# Patient Record
Sex: MALE | HISPANIC OR LATINO | URBAN - METROPOLITAN AREA
[De-identification: names, ages, dates, MRNs, and addresses within clinical notes are randomized per-mention and may not be internally consistent; named-entity substitution may affect disease eponyms.]

---

## 2021-03-29 ENCOUNTER — OFFICE VISIT CONVERTED (OUTPATIENT)
Dept: ONCOLOGY | Facility: HOSPITAL | Age: 31
End: 2021-03-29
Attending: INTERNAL MEDICINE

## 2021-03-29 ENCOUNTER — HOSPITAL ENCOUNTER (OUTPATIENT)
Dept: OTHER | Facility: HOSPITAL | Age: 31
Discharge: HOME | End: 2021-03-29
Attending: INTERNAL MEDICINE

## 2021-03-29 LAB
BASOPHILS # BLD AUTO: 0.01 10*3/UL (ref 0–0.2)
BASOPHILS NFR BLD AUTO: 0.3 % (ref 0–3)
CONV ABS IMM GRAN: 0.01 10*3/UL (ref 0–0.2)
CONV IMMATURE GRAN: 0.3 % (ref 0–1.8)
DEPRECATED RDW RBC AUTO: 42.9 FL (ref 35.1–43.9)
EOSINOPHIL # BLD AUTO: 0.01 10*3/UL (ref 0–0.7)
EOSINOPHIL # BLD AUTO: 0.3 % (ref 0–7)
ERYTHROCYTE [DISTWIDTH] IN BLOOD BY AUTOMATED COUNT: 12.6 % (ref 11.6–14.4)
FERRITIN SERPL-MCNC: 215 NG/ML (ref 30–300)
FOLATE SERPL-MCNC: 10.4 NG/ML (ref 4.8–20)
HCT VFR BLD AUTO: 48.2 % (ref 42–52)
HGB BLD-MCNC: 16.8 G/DL (ref 14–18)
IRON SATN MFR SERPL: 38 % (ref 20–55)
IRON SERPL-MCNC: 107 UG/DL (ref 70–180)
LYMPHOCYTES # BLD AUTO: 1.1 10*3/UL (ref 1–5)
LYMPHOCYTES NFR BLD AUTO: 29.6 % (ref 20–45)
MCH RBC QN AUTO: 32.2 PG (ref 27–31)
MCHC RBC AUTO-ENTMCNC: 34.9 G/DL (ref 33–37)
MCV RBC AUTO: 92.3 FL (ref 80–96)
MONOCYTES # BLD AUTO: 0.23 10*3/UL (ref 0.2–1.2)
MONOCYTES NFR BLD AUTO: 6.2 % (ref 3–10)
NEUTROPHILS # BLD AUTO: 2.36 10*3/UL (ref 2–8)
NEUTROPHILS NFR BLD AUTO: 63.3 % (ref 30–85)
NRBC CBCN: 0 % (ref 0–0.7)
PLATELET # BLD AUTO: 181 10*3/UL (ref 130–400)
PMV BLD AUTO: 10.3 FL (ref 9.4–12.4)
RBC # BLD AUTO: 5.22 10*6/UL (ref 4.7–6.1)
T4 FREE SERPL-MCNC: 1.3 NG/DL (ref 0.9–1.8)
TIBC SERPL-MCNC: 280 UG/DL (ref 245–450)
TRANSFERRIN SERPL-MCNC: 196 MG/DL (ref 215–365)
TSH SERPL-ACNC: 2.3 M[IU]/L (ref 0.27–4.2)
VIT B12 SERPL-MCNC: 506 PG/ML (ref 211–911)
WBC # BLD AUTO: 3.72 10*3/UL (ref 4.8–10.8)

## 2021-03-30 LAB
CONV HEPATITIS B SURFACE AG W CONFIRMATION RE: NEGATIVE
HAV IGM SERPL QL IA: NEGATIVE
HBV CORE IGM SERPL QL IA: NEGATIVE
HCV AB SER DONR QL: <0.1 S/CO RATIO (ref 0–0.9)

## 2021-05-28 VITALS
SYSTOLIC BLOOD PRESSURE: 130 MMHG | HEIGHT: 67 IN | TEMPERATURE: 98.3 F | RESPIRATION RATE: 16 BRPM | BODY MASS INDEX: 24.91 KG/M2 | DIASTOLIC BLOOD PRESSURE: 84 MMHG | OXYGEN SATURATION: 98 % | HEART RATE: 61 BPM | WEIGHT: 158.73 LBS

## 2021-05-28 NOTE — PROGRESS NOTES
Patient: MARY BETTENCOURT JR     Acct: [id number]     Report: #[id number]  UNIT #: [id number]     : 1990    Encounter Date:2021  PRIMARY CARE: MADDY MARTÍNEZ  ***Signed***  --------------------------------------------------------------------------------------------------------------------  NURSE INTAKE      Visit Type      New Patient Visit            Chief Complaint      DEC WBC            Referring Provider/Copies To      Referring Provider:  [facility]      Copies To:   [facility]            History and Present Illness      HPI - Hematology Interim      Mr. 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            The patient feels well overall.  He denies having any fever, chills, chest pain,    shortness of breath, abdominal pain, nausea or vomiting, change in bowel or     urine habits, weakness or numbness in arms or legs.            Reviewing the patient's lab work available to me from earlier this month showed     leukopenia:      3/22/2021: WBC 3.66, hemoglobin 15.9, hematocrit 45, platelets 190, sig 56%      3/19/2021: WBC 3.95, hemoglobin 17.4, platelets 193, sig 68%            PAST, FAMILY   Past Medical History      Past Medical History:  None      Hematology/Oncology (M):  None      Genetic/Metabolic:  None            Past Surgical History      Appendectomy            Family History      Family History:  No Family History            Social History       Marital Status:  Single      Lives independently:  Yes      Number of Children:  2            Tobacco Use      Tobacco status:  Former smoker      Currently Vaping:  No      Smoking history:  < 10 pack years            Alcohol Use      Alcohol intake:  None            Substance Use      Substance use:  Denies use            REVIEW OF SYSTEMS      General:  Denies: Appetite Change, Fatigue, Fever, Night Sweats, Weight Gain,     Weight Loss      Eye:  Denies Blurred Vision, Denies Corrective Lenses, Denies Diplopia, Denies     Vision Changes      ENT:  Denies Headache, Denies Hearing Loss, Denies Hoarseness, Denies Sore     Throat      Cardiovascular:  Denies Chest Pain, Denies Palpitations      Respiratory:  Denies: Cough, Coughing Blood, Productive Cough, Shortness of Air,    Wheezing      Gastrointestinal:  Denies Bloody Stools, Denies Constipation, Denies Diarrhea,     Denies Nausea/Vomiting, Denies Problem Swallowing, Denies Unable to Control     Bowels      Genitourinary:  Denies Blood in Urine, Denies Incontinence, Denies Painful     Urination      Musculoskeletal:  Denies Back Pain, Denies Muscle Pain, Denies Painful Joints      Integumentary:  Denies Itching, Denies Lesions, Denies Rash      Neurologic:  Denies Dizziness, Denies Numbness\Tingling, Denies Seizures      Psychiatric:  Denies Anxiety, Denies Depression      Endocrine:  Denies Cold Intolerance, Denies Heat Intolerance      Hematologic/Lymphatic:  Denies Bruising, Denies Bleeding, Denies Enlarged Lymph     Nodes            VITAL SIGNS AND SCORES      Vitals      Height 5 ft 6.93 in / 170 cm      Weight 158 lbs 11.699 oz / 72 kg      BSA 1.83 m2      BMI 24.9 kg/m2      Temperature 98.3 F / 36.83 C - Temporal      Pulse 61      Respirations 16      Blood Pressure 130/84 Sitting, Left Arm      Pulse Oximetry 98%, rm air            Pain Score      Experiencing any pain?:  No      Pain Scale Used:  Numerical      Pain Intensity:  0            Fatigue  Score      Experiencing any fatigue?:  No      Fatigue (0-10 scale):  0 (none)            EXAM      General Appearance:  Positive for: Alert, Oriented x3, Cooperative      Eye:  Positive for: Anicteric Sclerae, PERRLA      Neck:  Positive for: Full ROM, Supple      Respiratory:  Positive for: CTAB, Normal Respiratory Effort;          Negative for: Rales, Stridor      Abdomen/Gastro:  Positive for: Normal Active Bowel Sounds, Soft;          Negative for: Rebound, Tenderness      Cardiovascular:  Positive for: RRR;          Negative for: Murmur, Peripheral Edema      Psychiatric:  Positive for: AAO X 3, Appropriate Affect      Neurologic:  Positive for: Cranial Ner II-XII Intact;          Negative for: Numbness, Weakness            PREVENTION      Hx Influenza Vaccination:  Yes      Date Influenza Vaccine Given:  Nov 2, 2020      Influenza Vaccine Declined:  No      2 or More Falls in Past Year?:  No      Fall Past Year with Injury?:  No      Hx Pneumococcal Vaccination:  No      Encouraged to follow-up with:  PCP regarding preventative exams.      Chart initiated by      SHEILA ROSS MA            ALLERGY/MEDS      Allergies      Coded Allergies:             No Known Drug Allergies (Verified  Allergy, Intermediate, 3/29/21)            Medications      Medications Reviewed:  No Changes made to meds            IMPRESSION/PLAN      Plan      Assessment:      Mr. Adams is a 30-year-old gentleman who is a current active [employer], with no past medical history, and great physical condition, who     presents for further evaluation of leukopenia.  The available lab work available    to me from 3/19/2021 and 3/22/2021 showed WBC count of 3.6-3.9.              Impression:      - This is borderline low to normal range WBC, as other labs will have upper WBC     range to 3.0-3.5.      - Patient's neutropenia is very mild, which does not put him at any risk of     infection at this time, especially that  he does not have history of recurrent     infections.      - Although, there are several reasons for neutropenia in general, which could be    related to underlying medications, rheumatologist condition, or infection, the     fact that WBC repeatedly has been stable, and he does not have a history of     infection, this is reassuring,  besides his hemoglobin and platelets are     preserved, which makes an underlying bone marrow disease less likely.        - Obtain iron profile, ferritin level, vitamin B12 and folic acid level.     hepatitis panel; TSH w/ reflex to T4. HIV is being checked annually per his      requirements.      - If the lab work is unrevealing, I discussed with him that most likely his     neutropenia is benign ethnic or familial neutropenia.  He is not on medications     that can cause neutropenia at this time, which all does not need treatment, and     this should not put him at risk to perform any duty requirements.      - There is no indication G-CSF growth factors at this time as he does not have     recurrent infections.      - Moving forward, I will defer further monitoring to PCP, but we would be happy     to see the patient back if anything changes.            Patient Education      Patient Education Provided:  Yes            Electronically signed by ANNA MCGEE  03/29/2021 12:14       Disclaimer: Converted document may not contain table formatting or lab diagrams. Please see [employer] for the authenticated document.